# Patient Record
Sex: FEMALE | Race: WHITE | NOT HISPANIC OR LATINO | URBAN - METROPOLITAN AREA
[De-identification: names, ages, dates, MRNs, and addresses within clinical notes are randomized per-mention and may not be internally consistent; named-entity substitution may affect disease eponyms.]

---

## 2017-05-16 ENCOUNTER — EMERGENCY (EMERGENCY)
Facility: HOSPITAL | Age: 82
LOS: 1 days | Discharge: HOME | End: 2017-05-16
Attending: EMERGENCY MEDICINE | Admitting: EMERGENCY MEDICINE
Payer: MEDICARE

## 2017-05-16 VITALS
OXYGEN SATURATION: 99 % | HEART RATE: 80 BPM | SYSTOLIC BLOOD PRESSURE: 125 MMHG | RESPIRATION RATE: 16 BRPM | DIASTOLIC BLOOD PRESSURE: 65 MMHG

## 2017-05-16 VITALS
RESPIRATION RATE: 16 BRPM | TEMPERATURE: 98 F | DIASTOLIC BLOOD PRESSURE: 65 MMHG | OXYGEN SATURATION: 99 % | HEART RATE: 81 BPM | SYSTOLIC BLOOD PRESSURE: 149 MMHG

## 2017-05-16 DIAGNOSIS — Z98.890 OTHER SPECIFIED POSTPROCEDURAL STATES: Chronic | ICD-10-CM

## 2017-05-16 LAB
ALBUMIN SERPL ELPH-MCNC: 4.3 G/DL — SIGNIFICANT CHANGE UP (ref 3.3–5)
ALP SERPL-CCNC: 52 U/L — SIGNIFICANT CHANGE UP (ref 40–120)
ALT FLD-CCNC: 13 U/L — SIGNIFICANT CHANGE UP (ref 4–33)
APPEARANCE UR: SIGNIFICANT CHANGE UP
AST SERPL-CCNC: 24 U/L — SIGNIFICANT CHANGE UP (ref 4–32)
BACTERIA # UR AUTO: SIGNIFICANT CHANGE UP
BASOPHILS # BLD AUTO: 0.03 K/UL — SIGNIFICANT CHANGE UP (ref 0–0.2)
BASOPHILS NFR BLD AUTO: 0.2 % — SIGNIFICANT CHANGE UP (ref 0–2)
BILIRUB SERPL-MCNC: 0.6 MG/DL — SIGNIFICANT CHANGE UP (ref 0.2–1.2)
BILIRUB UR-MCNC: NEGATIVE — SIGNIFICANT CHANGE UP
BLOOD UR QL VISUAL: HIGH
BUN SERPL-MCNC: 20 MG/DL — SIGNIFICANT CHANGE UP (ref 7–23)
CALCIUM SERPL-MCNC: 10.4 MG/DL — SIGNIFICANT CHANGE UP (ref 8.4–10.5)
CHLORIDE SERPL-SCNC: 106 MMOL/L — SIGNIFICANT CHANGE UP (ref 98–107)
CK MB BLD-MCNC: 3.6 — HIGH (ref 0–2.5)
CK MB BLD-MCNC: 9.14 NG/ML — HIGH (ref 1–4.7)
CK SERPL-CCNC: 253 U/L — HIGH (ref 25–170)
CO2 SERPL-SCNC: 26 MMOL/L — SIGNIFICANT CHANGE UP (ref 22–31)
COLOR SPEC: YELLOW — SIGNIFICANT CHANGE UP
CREAT SERPL-MCNC: 0.88 MG/DL — SIGNIFICANT CHANGE UP (ref 0.5–1.3)
EOSINOPHIL # BLD AUTO: 0.01 K/UL — SIGNIFICANT CHANGE UP (ref 0–0.5)
EOSINOPHIL NFR BLD AUTO: 0.1 % — SIGNIFICANT CHANGE UP (ref 0–6)
GLUCOSE SERPL-MCNC: 124 MG/DL — HIGH (ref 70–99)
GLUCOSE UR-MCNC: NEGATIVE — SIGNIFICANT CHANGE UP
HCT VFR BLD CALC: 41.1 % — SIGNIFICANT CHANGE UP (ref 34.5–45)
HGB BLD-MCNC: 12.5 G/DL — SIGNIFICANT CHANGE UP (ref 11.5–15.5)
IMM GRANULOCYTES NFR BLD AUTO: 0.2 % — SIGNIFICANT CHANGE UP (ref 0–1.5)
KETONES UR-MCNC: NEGATIVE — SIGNIFICANT CHANGE UP
LEUKOCYTE ESTERASE UR-ACNC: HIGH
LYMPHOCYTES # BLD AUTO: 1.02 K/UL — SIGNIFICANT CHANGE UP (ref 1–3.3)
LYMPHOCYTES # BLD AUTO: 7.2 % — LOW (ref 13–44)
MCHC RBC-ENTMCNC: 26.8 PG — LOW (ref 27–34)
MCHC RBC-ENTMCNC: 30.4 % — LOW (ref 32–36)
MCV RBC AUTO: 88.2 FL — SIGNIFICANT CHANGE UP (ref 80–100)
MONOCYTES # BLD AUTO: 1.03 K/UL — HIGH (ref 0–0.9)
MONOCYTES NFR BLD AUTO: 7.2 % — SIGNIFICANT CHANGE UP (ref 2–14)
MUCOUS THREADS # UR AUTO: SIGNIFICANT CHANGE UP
NEUTROPHILS # BLD AUTO: 12.13 K/UL — HIGH (ref 1.8–7.4)
NEUTROPHILS NFR BLD AUTO: 85.1 % — HIGH (ref 43–77)
NITRITE UR-MCNC: POSITIVE — HIGH
PH UR: 6.5 — SIGNIFICANT CHANGE UP (ref 4.6–8)
PLATELET # BLD AUTO: 334 K/UL — SIGNIFICANT CHANGE UP (ref 150–400)
PMV BLD: 9.1 FL — SIGNIFICANT CHANGE UP (ref 7–13)
POTASSIUM SERPL-MCNC: 4.6 MMOL/L — SIGNIFICANT CHANGE UP (ref 3.5–5.3)
POTASSIUM SERPL-SCNC: 4.6 MMOL/L — SIGNIFICANT CHANGE UP (ref 3.5–5.3)
PROT SERPL-MCNC: 7 G/DL — SIGNIFICANT CHANGE UP (ref 6–8.3)
PROT UR-MCNC: 100 — HIGH
RBC # BLD: 4.66 M/UL — SIGNIFICANT CHANGE UP (ref 3.8–5.2)
RBC # FLD: 12.7 % — SIGNIFICANT CHANGE UP (ref 10.3–14.5)
SODIUM SERPL-SCNC: 148 MMOL/L — HIGH (ref 135–145)
SP GR SPEC: 1.04 — HIGH (ref 1–1.03)
TROPONIN T SERPL-MCNC: < 0.06 NG/ML — SIGNIFICANT CHANGE UP (ref 0–0.06)
UROBILINOGEN FLD QL: NORMAL E.U. — SIGNIFICANT CHANGE UP (ref 0.1–0.2)
WBC # BLD: 14.25 K/UL — HIGH (ref 3.8–10.5)
WBC # FLD AUTO: 14.25 K/UL — HIGH (ref 3.8–10.5)
WBC CLUMPS #/AREA URNS HPF: PRESENT — HIGH (ref 0–?)
WBC UR QL: >50 — HIGH (ref 0–?)

## 2017-05-16 PROCEDURE — 71010: CPT | Mod: 26

## 2017-05-16 PROCEDURE — 93010 ELECTROCARDIOGRAM REPORT: CPT

## 2017-05-16 PROCEDURE — 73521 X-RAY EXAM HIPS BI 2 VIEWS: CPT | Mod: 26

## 2017-05-16 PROCEDURE — 99285 EMERGENCY DEPT VISIT HI MDM: CPT | Mod: 25,GC

## 2017-05-16 RX ORDER — CEPHALEXIN 500 MG
1 CAPSULE ORAL
Qty: 14 | Refills: 0 | OUTPATIENT
Start: 2017-05-16 | End: 2017-05-23

## 2017-05-16 RX ORDER — CEPHALEXIN 500 MG
500 CAPSULE ORAL ONCE
Qty: 0 | Refills: 0 | Status: COMPLETED | OUTPATIENT
Start: 2017-05-16 | End: 2017-05-16

## 2017-05-16 RX ADMIN — Medication 500 MILLIGRAM(S): at 11:22

## 2017-05-16 NOTE — ED ADULT NURSE NOTE - CHIEF COMPLAINT QUOTE
Pt brought in by [facility] c/o tenderness /"ache" to left hip area ( hx of left hip fx). Pt reports her phone fell on floor she knelt on floor to pick it up and was unable to get up- sat on floor for 3 hours. Per EMS pt stood with their assistance and too steps while on scene. Denies dizziness, trauma or fall.

## 2017-05-16 NOTE — ED PROVIDER NOTE - MEDICAL DECISION MAKING DETAILS
weakness, h/o r hip fracture (low suspicion), will get cbc, cmp, x-ray hips (h/o osteo) and re-assess

## 2017-05-16 NOTE — ED ADULT TRIAGE NOTE - CHIEF COMPLAINT QUOTE
Pt brought in by [facility] c/o tenderness/ "ache" to left hip area ( hx of left hip fx). Pt reports her phone fell on floor she knelt on floor to pick it up and was unable to get up- sat on floor for 3 hours. Denies dizziness, trauma or fall. Pt brought in by [facility] c/o tenderness /"ache" to left hip area ( hx of left hip fx). Pt reports her phone fell on floor she knelt on floor to pick it up and was unable to get up- sat on floor for 3 hours. Denies dizziness, trauma or fall. Pt brought in by [facility] c/o tenderness /"ache" to left hip area ( hx of left hip fx). Pt reports her phone fell on floor she knelt on floor to pick it up and was unable to get up- sat on floor for 3 hours. Per EMS pt stood with their assistance and too steps while on scene. Denies dizziness, trauma or fall.

## 2017-05-16 NOTE — ED PROVIDER NOTE - PROGRESS NOTE DETAILS
Patient was diagnosed with a UTI, sent home on Keflex. Pt was able to ambulate here in the ER without any  pain, she is stable and ready to be discharged.

## 2017-05-16 NOTE — ED PROVIDER NOTE - ATTENDING CONTRIBUTION TO CARE
att84 y/o f with h/o HTN, OA, s/p r hip surgery for acetabular fx, presents after a mechanical fall. Pt lives home alone. She dropped her phone next to her bed. She went down to get her phone, no head injury, no fall to the ground. She had pain in the R hip and she states she was scared to get up with the pain. On ground for about 3 hrs. No other symptoms, no cp, no sob, no weakness, no numbness. Pt uses cane. Nephew with patient in room. Pt states she took excedrin prior to arrival. No pain currently. No pain on exam. pelvis stable. Nv intact. see above. Plan for xray r/u fx, and if xr neg, will need to ambulate. labs sent prior to eval. Will sign out patient, seen at end of shift.   I have personally performed a face to face bedside history and physical examination of this patient. I have discussed the history, examination, review of systems, assessment and plan of management with the resident. I have reviewed the electronic medical record and amended it to reflect my history, review of systems, physical exam, assessment and plan. att86 y/o f with h/o HTN, OA, s/p r hip surgery for acetabular fx, presents with R hip pain after she lowered herself to the floor. Pt lives home alone. She dropped her phone next to her bed. She went down to get her phone, no head injury, no fall to the ground. She had pain in the R hip and she states she was scared to get up with the pain. On ground for about 3 hrs. No other symptoms, no cp, no sob, no weakness, no numbness. Pt uses cane. Nephew with patient in room. Pt states she took excedrin prior to arrival. No pain currently. No pain on exam. pelvis stable. Nv intact. see above. Plan for xray r/u fx, and if xr neg, will need to ambulate. labs sent prior to eval. Will sign out patient, seen at end of shift.   I have personally performed a face to face bedside history and physical examination of this patient. I have discussed the history, examination, review of systems, assessment and plan of management with the resident. I have reviewed the electronic medical record and amended it to reflect my history, review of systems, physical exam, assessment and plan.

## 2017-05-16 NOTE — PROVIDER CONTACT NOTE (OTHER) - ASSESSMENT
Met with patient and nephew at bedside in ED.  Patient is a very engaging 85 year old, single,  female. Patient is alert and oriented x3, independent with ADL's with assist from family.  Patient lives alone in apartment and reports she has been managing well. Patient declines need for home care services or community resources at this time.  Provided information on HCP.  SW will follow with patient in the community.

## 2017-05-16 NOTE — ED PROVIDER NOTE - CONSTITUTIONAL, MLM
normal... Well appearing, well nourished, awake, alert, oriented to person, place, time/situation and in no apparent distress. pleasant. Well appearing, well nourished, awake, alert, oriented to person, place, time/situation and in no apparent distress.

## 2017-05-16 NOTE — ED PROVIDER NOTE - MUSCULOSKELETAL, MLM
Spine appears normal, range of motion is not limited, no muscle or joint tenderness Spine appears normal, range of motion is not limited, no muscle or joint tenderness. pelvis stable. no TTP bl hips, normal intx/ext rotation. nv intact distally. no midline spinal ttp in cervical, thoracic or lumbar spine.

## 2017-05-16 NOTE — ED PROVIDER NOTE - NEUROLOGICAL, MLM
AA0x3, 5/5 UE and LE strength; AA0x3, 5/5 UE and LE strength; cn 2-12 intact bl. ms 5/5 bl ue and le. sensation intact bl.

## 2017-05-16 NOTE — ED ADULT NURSE REASSESSMENT NOTE - NS ED NURSE REASSESS COMMENT FT1
pt a&ox3 vs wnl. pt xray was negative. got pt ip and walked to bathroom with no issues. UA collected and sent

## 2017-05-16 NOTE — ED PROVIDER NOTE - ENMT, MLM
Airway patent, Nasal mucosa clear. Mouth with normal mucosa. Throat has no vesicles, no oropharyngeal exudates and uvula is midline. Airway patent, mmm

## 2017-05-16 NOTE — ED PROVIDER NOTE - OBJECTIVE STATEMENT
Pt brought in by [facility] c/o tenderness /"ache" to left hip area ( hx of left hip fx). Pt reports her phone fell on floor she knelt on floor to pick it up and was unable to get up- sat on floor for 3 hours. Per EMS pt stood with their assistance and too steps while on scene. Denies dizziness, trauma or fall. 85F with htn, osteoporosis (in r leg) p/w  tenderness /"ache" to left hip area ( hx of left hip fx). Pt reports her phone fell on floor she knelt on floor to pick it up and was unable to get up- sat on floor for 3 hours. Per EMS pt stood with their assistance and too steps while on scene. Denies dizziness, trauma or fall/ cp/sob/v/dysuria/d; 85F with htn, osteoporosis (in r leg) p/w  tenderness /"ache" to R hip area ( hx of r hip fx). Pt reports her phone fell on floor she knelt on floor to pick it up and was unable/scared to get up- sat on floor for 3 hours. Per EMS pt stood with their assistance and too steps while on scene. Denies dizziness, trauma or fall/ cp/sob/v/dysuria/d;

## 2017-05-16 NOTE — ED ADULT NURSE NOTE - OBJECTIVE STATEMENT
Patient received in trauma c c/o "aching" to right lower extremity. A&Ox3. Patient reports she was reaching for her phone that fell under her bed and felt an ache to her L LE. Patient then sat herself onto the floor and reports sitting on the floor for 3 hours unable to stand herself up. Patient reports hx. osteoporosis. Denies any numbness or tingling to LE. Denies CP or SOB. VS as noted. MD at bedside for evaluation. Will monitor.

## 2017-05-16 NOTE — ED PROVIDER NOTE - NEURO NEGATIVE STATEMENT, MLM
no loss of consciousness, no sensory deficits, and no weakness. pt unable to get up. She states she was scared to walk with the pain.

## 2017-05-16 NOTE — ED PROVIDER NOTE - CARE PLAN
Principal Discharge DX:	Fall  Secondary Diagnosis:	UTI (urinary tract infection) Principal Discharge DX:	Hip pain  Secondary Diagnosis:	UTI (urinary tract infection)

## 2017-05-17 LAB — SPECIMEN SOURCE: SIGNIFICANT CHANGE UP

## 2017-05-18 LAB
-  AMIKACIN: SIGNIFICANT CHANGE UP
-  AMPICILLIN/SULBACTAM: SIGNIFICANT CHANGE UP
-  AMPICILLIN: SIGNIFICANT CHANGE UP
-  AZTREONAM: SIGNIFICANT CHANGE UP
-  CEFAZOLIN: SIGNIFICANT CHANGE UP
-  CEFEPIME: SIGNIFICANT CHANGE UP
-  CEFOXITIN: SIGNIFICANT CHANGE UP
-  CEFTAZIDIME: SIGNIFICANT CHANGE UP
-  CEFTRIAXONE: SIGNIFICANT CHANGE UP
-  CIPROFLOXACIN: SIGNIFICANT CHANGE UP
-  ERTAPENEM: SIGNIFICANT CHANGE UP
-  GENTAMICIN: SIGNIFICANT CHANGE UP
-  IMIPENEM: SIGNIFICANT CHANGE UP
-  LEVOFLOXACIN: SIGNIFICANT CHANGE UP
-  MEROPENEM: SIGNIFICANT CHANGE UP
-  NITROFURANTOIN: SIGNIFICANT CHANGE UP
-  PIPERACILLIN/TAZOBACTAM: SIGNIFICANT CHANGE UP
-  TIGECYCLINE: SIGNIFICANT CHANGE UP
-  TOBRAMYCIN: SIGNIFICANT CHANGE UP
-  TRIMETHOPRIM/SULFAMETHOXAZOLE: SIGNIFICANT CHANGE UP
BACTERIA UR CULT: SIGNIFICANT CHANGE UP
METHOD TYPE: SIGNIFICANT CHANGE UP
ORGANISM # SPEC MICROSCOPIC CNT: SIGNIFICANT CHANGE UP
ORGANISM # SPEC MICROSCOPIC CNT: SIGNIFICANT CHANGE UP

## 2017-05-18 NOTE — ED POST DISCHARGE NOTE - RESULT SUMMARY
UCX: E.coli > 100,000.  Pt discharged on Keflex 500mg BID x 7 days.  Results prelim.  Follow results to final.

## 2020-11-10 ENCOUNTER — EMERGENCY (EMERGENCY)
Facility: HOSPITAL | Age: 85
LOS: 1 days | Discharge: HOME | End: 2020-11-10
Attending: EMERGENCY MEDICINE | Admitting: EMERGENCY MEDICINE
Payer: MEDICARE

## 2020-11-10 VITALS
DIASTOLIC BLOOD PRESSURE: 62 MMHG | OXYGEN SATURATION: 100 % | TEMPERATURE: 98 F | RESPIRATION RATE: 18 BRPM | HEART RATE: 66 BPM | SYSTOLIC BLOOD PRESSURE: 106 MMHG

## 2020-11-10 VITALS
SYSTOLIC BLOOD PRESSURE: 129 MMHG | OXYGEN SATURATION: 100 % | RESPIRATION RATE: 16 BRPM | HEART RATE: 78 BPM | DIASTOLIC BLOOD PRESSURE: 79 MMHG

## 2020-11-10 DIAGNOSIS — Z98.890 OTHER SPECIFIED POSTPROCEDURAL STATES: Chronic | ICD-10-CM

## 2020-11-10 PROBLEM — M81.0 AGE-RELATED OSTEOPOROSIS WITHOUT CURRENT PATHOLOGICAL FRACTURE: Chronic | Status: ACTIVE | Noted: 2017-05-16

## 2020-11-10 PROBLEM — I10 ESSENTIAL (PRIMARY) HYPERTENSION: Chronic | Status: ACTIVE | Noted: 2017-05-16

## 2020-11-10 LAB
ALBUMIN SERPL ELPH-MCNC: 4 G/DL — SIGNIFICANT CHANGE UP (ref 3.3–5)
ALP SERPL-CCNC: 48 U/L — SIGNIFICANT CHANGE UP (ref 40–120)
ALT FLD-CCNC: 8 U/L — SIGNIFICANT CHANGE UP (ref 4–33)
ANION GAP SERPL CALC-SCNC: 10 MMO/L — SIGNIFICANT CHANGE UP (ref 7–14)
APTT BLD: 44.1 SEC — HIGH (ref 27–36.3)
AST SERPL-CCNC: 18 U/L — SIGNIFICANT CHANGE UP (ref 4–32)
BASOPHILS # BLD AUTO: 0.03 K/UL — SIGNIFICANT CHANGE UP (ref 0–0.2)
BASOPHILS NFR BLD AUTO: 0.4 % — SIGNIFICANT CHANGE UP (ref 0–2)
BILIRUB SERPL-MCNC: 0.4 MG/DL — SIGNIFICANT CHANGE UP (ref 0.2–1.2)
BUN SERPL-MCNC: 20 MG/DL — SIGNIFICANT CHANGE UP (ref 7–23)
CALCIUM SERPL-MCNC: 10.4 MG/DL — SIGNIFICANT CHANGE UP (ref 8.4–10.5)
CHLORIDE SERPL-SCNC: 106 MMOL/L — SIGNIFICANT CHANGE UP (ref 98–107)
CO2 SERPL-SCNC: 27 MMOL/L — SIGNIFICANT CHANGE UP (ref 22–31)
CREAT SERPL-MCNC: 0.89 MG/DL — SIGNIFICANT CHANGE UP (ref 0.5–1.3)
EOSINOPHIL # BLD AUTO: 0.14 K/UL — SIGNIFICANT CHANGE UP (ref 0–0.5)
EOSINOPHIL NFR BLD AUTO: 1.7 % — SIGNIFICANT CHANGE UP (ref 0–6)
GLUCOSE SERPL-MCNC: 119 MG/DL — HIGH (ref 70–99)
HCT VFR BLD CALC: 43.1 % — SIGNIFICANT CHANGE UP (ref 34.5–45)
HGB BLD-MCNC: 12.6 G/DL — SIGNIFICANT CHANGE UP (ref 11.5–15.5)
IMM GRANULOCYTES NFR BLD AUTO: 0.4 % — SIGNIFICANT CHANGE UP (ref 0–1.5)
INR BLD: 1.23 — HIGH (ref 0.88–1.16)
LYMPHOCYTES # BLD AUTO: 0.96 K/UL — LOW (ref 1–3.3)
LYMPHOCYTES # BLD AUTO: 11.9 % — LOW (ref 13–44)
MCHC RBC-ENTMCNC: 26.6 PG — LOW (ref 27–34)
MCHC RBC-ENTMCNC: 29.2 % — LOW (ref 32–36)
MCV RBC AUTO: 90.9 FL — SIGNIFICANT CHANGE UP (ref 80–100)
MONOCYTES # BLD AUTO: 0.53 K/UL — SIGNIFICANT CHANGE UP (ref 0–0.9)
MONOCYTES NFR BLD AUTO: 6.6 % — SIGNIFICANT CHANGE UP (ref 2–14)
NEUTROPHILS # BLD AUTO: 6.37 K/UL — SIGNIFICANT CHANGE UP (ref 1.8–7.4)
NEUTROPHILS NFR BLD AUTO: 79 % — HIGH (ref 43–77)
NRBC # FLD: 0 K/UL — SIGNIFICANT CHANGE UP (ref 0–0)
PLATELET # BLD AUTO: 322 K/UL — SIGNIFICANT CHANGE UP (ref 150–400)
PMV BLD: 9.1 FL — SIGNIFICANT CHANGE UP (ref 7–13)
POTASSIUM SERPL-MCNC: 4.6 MMOL/L — SIGNIFICANT CHANGE UP (ref 3.5–5.3)
POTASSIUM SERPL-SCNC: 4.6 MMOL/L — SIGNIFICANT CHANGE UP (ref 3.5–5.3)
PROT SERPL-MCNC: 6.8 G/DL — SIGNIFICANT CHANGE UP (ref 6–8.3)
PROTHROM AB SERPL-ACNC: 14 SEC — HIGH (ref 10.6–13.6)
RBC # BLD: 4.74 M/UL — SIGNIFICANT CHANGE UP (ref 3.8–5.2)
RBC # FLD: 12.6 % — SIGNIFICANT CHANGE UP (ref 10.3–14.5)
SODIUM SERPL-SCNC: 143 MMOL/L — SIGNIFICANT CHANGE UP (ref 135–145)
WBC # BLD: 8.06 K/UL — SIGNIFICANT CHANGE UP (ref 3.8–10.5)
WBC # FLD AUTO: 8.06 K/UL — SIGNIFICANT CHANGE UP (ref 3.8–10.5)

## 2020-11-10 PROCEDURE — 71260 CT THORAX DX C+: CPT | Mod: 26

## 2020-11-10 PROCEDURE — 70450 CT HEAD/BRAIN W/O DYE: CPT | Mod: 26

## 2020-11-10 PROCEDURE — 74177 CT ABD & PELVIS W/CONTRAST: CPT | Mod: 26

## 2020-11-10 PROCEDURE — 72125 CT NECK SPINE W/O DYE: CPT | Mod: 26

## 2020-11-10 PROCEDURE — 99284 EMERGENCY DEPT VISIT MOD MDM: CPT | Mod: GC

## 2020-11-10 RX ORDER — ACETAMINOPHEN 500 MG
650 TABLET ORAL ONCE
Refills: 0 | Status: COMPLETED | OUTPATIENT
Start: 2020-11-10 | End: 2020-11-10

## 2020-11-10 RX ORDER — OXYCODONE HYDROCHLORIDE 5 MG/1
1 TABLET ORAL
Qty: 15 | Refills: 0
Start: 2020-11-10

## 2020-11-10 RX ORDER — OXYCODONE AND ACETAMINOPHEN 5; 325 MG/1; MG/1
1 TABLET ORAL ONCE
Refills: 0 | Status: DISCONTINUED | OUTPATIENT
Start: 2020-11-10 | End: 2020-11-10

## 2020-11-10 RX ORDER — LIDOCAINE 4 G/100G
1 CREAM TOPICAL
Qty: 12 | Refills: 0
Start: 2020-11-10

## 2020-11-10 RX ORDER — LIDOCAINE 4 G/100G
1 CREAM TOPICAL ONCE
Refills: 0 | Status: COMPLETED | OUTPATIENT
Start: 2020-11-10 | End: 2020-11-10

## 2020-11-10 RX ORDER — IBUPROFEN 200 MG
1 TABLET ORAL
Qty: 30 | Refills: 0
Start: 2020-11-10

## 2020-11-10 RX ADMIN — Medication 650 MILLIGRAM(S): at 19:16

## 2020-11-10 RX ADMIN — LIDOCAINE 1 PATCH: 4 CREAM TOPICAL at 19:16

## 2020-11-10 NOTE — ED ADULT NURSE NOTE - OBJECTIVE STATEMENT
89 yo f presents to the ed with complaints of left sided pain after falling unwitnessed 4 days ago. pt denies blood thinners, LOC, and hitting her head. pt has bruising on her left hip area. pt has full mobility in all 4 extremities, pulses, and sensation. pt is a&ox4, lung sounds clear bilat, pulses x4 extremities +1, abd soft non distended, skin WNL. 20 g placed in R forearm.

## 2020-11-10 NOTE — ED PROVIDER NOTE - PHYSICAL EXAMINATION
CONSTITUTIONAL: non-toxic, well appearing  SKIN: no rash, no petechiae.  EYES: PERRL, EOMI, pink conjunctiva, anicteric  ENT: tongue and uvular midline, no exudates, moist mucous membranes  NECK: Supple; no meningismus, no JVD  CARD: RRR, no murmurs, equal radial pulses bilaterally 2+  RESP: CTAB, no respiratory distress  ABD: Soft, ecchymosis over LUQ/left lower chest, tender, non-distended, no peritoneal signs, no CVA tenderness  SPINE: no midline bony tenderness, FROM  EXT: Normal ROM x4. No edema.   NEURO: Alert, oriented. Neuro exam nonfocal.  PSYCH: Cooperative, appropriate.

## 2020-11-10 NOTE — ED PROVIDER NOTE - PATIENT PORTAL LINK FT
You can access the FollowMyHealth Patient Portal offered by [facility] by registering at the following website: [email or web address]. By joining [facility]’s FollowMyHealth portal, you will also be able to view your health information using other applications (apps) compatible with our system.

## 2020-11-10 NOTE — ED PROVIDER NOTE - PROGRESS NOTE DETAILS
BENNETT Sal: labs reviewed without gross abnormal values. CT of chest revealed fracture of 9th-11th rib fracture. discussion had with Son and daugther-in-law regarding transferring patient to [facility] for evaluation by trauma surgeon, but they declined. Patient has 24 hour home health aid services and family is confident that patient can be cared for at home. RIsk and complications of patient not being transferred were discussed and family verbalized comprehension. education, use of incentive spirometer were demonstrated. family will sign patient out AMA. Alcoholic intoxication without complication

## 2020-11-10 NOTE — ED ADULT TRIAGE NOTE - CHIEF COMPLAINT QUOTE
pt brought to ED by son, as per son, pt had unwitnessed fall 4 days ago.  today pt having pain to left side.  pt was sent to ED from urgent care for evaluation.  denies blood thinners.  pt denies LOC at time of fall

## 2020-11-10 NOTE — ED PROVIDER NOTE - OBJECTIVE STATEMENT
89 year old female with PMH HTN, osteoporosis, "heart disease" presents after unwitnessed fall. Pt states she lives alone with full time home health aide, per daughter-in-law at bedside, pt was noted to have unwitnessed fall 4 nights after while attempting to walk to the kitchen, falling onto her left buttock.    TO BE COMPLETED 89 year old female with PMH HTN, osteoporosis, "heart disease" presents after unwitnessed fall. Pt states she lives alone with full time home health aide, per daughter-in-law at bedside, pt was noted to have unwitnessed fall 4 nights after while attempting to walk to the kitchen, falling onto her left buttock. Pt ambulatory post-fall, reports increased pain over the past 4 days. Pt reports taking ibuprofen with temporary improvement of pain. Denies any head injury or LOC. Pt states she was seen at urgent care earlier today and advised to go to the Emergency Department for further evaluation and management. Denies any additional complaints.

## 2020-11-10 NOTE — ED PROVIDER NOTE - ATTENDING CONTRIBUTION TO CARE
agree with resident note    "89 year old female with PMH HTN, osteoporosis, "heart disease" presents after unwitnessed fall. Pt states she lives alone with full time home health aide, per daughter-in-law at bedside, pt was noted to have unwitnessed fall 4 nights after while attempting to walk to the kitchen, falling onto her left buttock."  per daughter in law who is present here pt has walker and cane and should be using at all times but 4 days ago without assistance attempted to walk on her own and fell back.  Had been able to get up with assistance and is ambulatory but family was concerned as she was slightly labored in breathing and they noticed bruising over ribs.    PE: well appearing; NCAT; PERRL; CTAB/L; s1 s2 no m/r/g ecchymosis over left rib; mild tenderness over lower anterior rib; no flail chest; abd soft/NT/ND pelvis stable; ext: no deformity    Imp: will get Ct scan of chest and abdomen and head and reassess  likely rib fx vs contusion

## 2020-11-10 NOTE — ED PROVIDER NOTE - NSFOLLOWUPINSTRUCTIONS_ED_ALL_ED_FT
Fracture    A fracture is a break in one of your bones. This can occur from a variety of injuries, especially traumatic ones. Symptoms include pain, bruising, or swelling. Do not use the injured limb. If a fracture is in one of the bones below your waist, do not put weight on that limb unless instructed to do so by your healthcare provider. Crutches or a cane may have been provided. A splint or cast may have been applied by your health care provider. Make sure to keep it dry and follow up with an orthopedist as instructed.    SEEK IMMEDIATE MEDICAL CARE IF YOU HAVE ANY OF THE FOLLOWING SYMPTOMS: numbness, tingling, increasing pain, or weakness in any part of the injured limb.    Please return to ED if you experience fever, chills, shortness of breath, cough, sputum production

## 2020-11-10 NOTE — ED ADULT NURSE NOTE - NSIMPLEMENTINTERV_GEN_ALL_ED
Implemented All Fall Risk Interventions:  [place] to call system. Call bell, personal items and telephone within reach. Instruct patient to call for assistance. Room bathroom lighting operational. Non-slip footwear when patient is off stretcher. Physically safe environment: no spills, clutter or unnecessary equipment. Stretcher in lowest position, wheels locked, appropriate side rails in place. Provide visual cue, wrist band, yellow gown, etc. Monitor gait and stability. Monitor for mental status changes and reorient to person, place, and time. Review medications for side effects contributing to fall risk. Reinforce activity limits and safety measures with patient and family.

## 2021-04-17 NOTE — ED ADULT NURSE NOTE - EXTENSIONS OF SELF_ADULT
Medicine Progress Note    Patient is a 63y old  Female who presents with a chief complaint of R sided numbness (16 Apr 2021 13:48)      SUBJECTIVE / OVERNIGHT EVENTS:  seen and examined  Chart reviewed  No overnight events    still have RUE and RLE tingling and numbness and burning pain. weakness improving  BM+    ADDITIONAL REVIEW OF SYSTEMS:  no fever/chills/CP/sob/palpitation/dizziness/ abd pain/nausea/vomiting/diarrhea/constipation/headaches. all other ROS neg    MEDICATIONS  (STANDING):  aspirin enteric coated 81 milliGRAM(s) Oral daily  atorvastatin 80 milliGRAM(s) Oral at bedtime  clopidogrel Tablet 75 milliGRAM(s) Oral daily  dextrose 40% Gel 15 Gram(s) Oral once  dextrose 5%. 1000 milliLiter(s) (50 mL/Hr) IV Continuous <Continuous>  dextrose 5%. 1000 milliLiter(s) (100 mL/Hr) IV Continuous <Continuous>  dextrose 50% Injectable 25 Gram(s) IV Push once  dextrose 50% Injectable 12.5 Gram(s) IV Push once  dextrose 50% Injectable 25 Gram(s) IV Push once  enoxaparin Injectable 40 milliGRAM(s) SubCutaneous daily  gabapentin 300 milliGRAM(s) Oral two times a day  glucagon  Injectable 1 milliGRAM(s) IntraMuscular once  insulin glargine Injectable (LANTUS) 40 Unit(s) SubCutaneous at bedtime  insulin lispro (ADMELOG) corrective regimen sliding scale   SubCutaneous three times a day before meals  insulin lispro (ADMELOG) corrective regimen sliding scale   SubCutaneous at bedtime  insulin lispro Injectable (ADMELOG) 15 Unit(s) SubCutaneous before breakfast  insulin lispro Injectable (ADMELOG) 15 Unit(s) SubCutaneous before lunch  insulin lispro Injectable (ADMELOG) 15 Unit(s) SubCutaneous before dinner  lidocaine   Patch 1 Patch Transdermal every 24 hours  lisinopril 20 milliGRAM(s) Oral daily  omega-3-Acid Ethyl Esters 2 Gram(s) Oral two times a day  pantoprazole    Tablet 40 milliGRAM(s) Oral before breakfast    MEDICATIONS  (PRN):  acetaminophen   Tablet .. 650 milliGRAM(s) Oral every 6 hours PRN Temp greater or equal to 38C (100.4F), Mild Pain (1 - 3)    CAPILLARY BLOOD GLUCOSE      POCT Blood Glucose.: 231 mg/dL (17 Apr 2021 08:20)  POCT Blood Glucose.: 208 mg/dL (16 Apr 2021 21:36)  POCT Blood Glucose.: 182 mg/dL (16 Apr 2021 16:39)  POCT Blood Glucose.: 331 mg/dL (16 Apr 2021 11:51)    I&O's Summary    16 Apr 2021 07:01  -  17 Apr 2021 07:00  --------------------------------------------------------  IN: 600 mL / OUT: 4 mL / NET: 596 mL        PHYSICAL EXAM:  Vital Signs Last 24 Hrs  T(C): 36.4 (17 Apr 2021 08:30), Max: 37 (17 Apr 2021 05:49)  T(F): 97.6 (17 Apr 2021 08:30), Max: 98.6 (17 Apr 2021 05:49)  HR: 75 (17 Apr 2021 08:30) (75 - 88)  BP: 166/84 (17 Apr 2021 08:30) (149/83 - 166/84)  BP(mean): --  RR: 16 (17 Apr 2021 08:30) (16 - 17)  SpO2: 100% (17 Apr 2021 08:30) (96% - 100%)    GENERAL: Not in distress. Alert    HEENT:  MMM  NECK: Supple.    CARDIOVASCULAR: RRR S1, S2. No murmur/rubs/gallop  LUNGS: BLAE+, no rales, no wheezing, no rhonchi.    ABDOMEN: ND. Soft,  NT, no guarding / rebound / rigidity. BS normoactive. No CVA tenderness.    BACK: No spine tenderness.  EXTREMITIES: no edema. no leg or calf TP.  SKIN: no rash. or skin break or ulcer on exposed skin. No cellulitis.  NEUROLOGIC: AAO*3.strength is symmetric, sensation reduced on RUE and RLE, speech fluent.    PSYCHIATRIC: Calm.  No agitation.    LABS:                        13.2   5.11  )-----------( 211      ( 17 Apr 2021 05:30 )             40.7     04-17    138  |  103  |  20  ----------------------------<  314<H>  4.5   |  27  |  0.71    Ca    9.1      17 Apr 2021 05:30                COVID-19 PCR: Deandra (13 Apr 2021 19:45)      RADIOLOGY & ADDITIONAL TESTS:  Imaging from Last 24 Hours:    Electrocardiogram/QTc Interval:    COORDINATION OF CARE:  Care Discussed with Consultants/Other Providers:   None

## 2021-05-27 NOTE — ED PROVIDER NOTE - RESPIRATORY, MLM
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 and moving with him     > Pain from heel up to the buttocks         At home:   > OTC pain relievers with limited success - naproxen and \"percogesic\" (APAP and antihistamine)   > Aspercream rubs   > Heat packs / \"thermacare\" heat packs       A/P:   > Agree with at homecare thus far - would need appt to be assessed for stronger pain medications than what you have at home   > Appt with NP today for eval       Reason for Disposition    MODERATE pain (e.g., interferes with normal activities, limping) and present > 3 days    Protocols used: LEG PAIN-A-OH      " Breath sounds clear and equal bilaterally.

## 2023-06-21 NOTE — ED ADULT NURSE NOTE - ADDITIONAL PRINTED INSTRUCTIONS GIVEN
***No further stenting done today (6/21/2023) do not need to follow up for post-procedure.    Expect call from heart clinic to arrange appointments for echocardiogram and then follow up appointment.  Heart clinic number [phone] call next week if they haven't contacted you.    No change in your medications.    Please start cardiac rehab.  Do not have to wait to start per Dr ONI lFores, already 2 weeks post initial stenting.  
by MD
